# Patient Record
Sex: MALE | Race: WHITE | NOT HISPANIC OR LATINO
[De-identification: names, ages, dates, MRNs, and addresses within clinical notes are randomized per-mention and may not be internally consistent; named-entity substitution may affect disease eponyms.]

---

## 2017-09-07 ENCOUNTER — APPOINTMENT (OUTPATIENT)
Dept: ORTHOPEDIC SURGERY | Facility: CLINIC | Age: 67
End: 2017-09-07

## 2018-03-14 ENCOUNTER — APPOINTMENT (OUTPATIENT)
Dept: NEUROLOGY | Facility: CLINIC | Age: 68
End: 2018-03-14
Payer: MEDICARE

## 2018-03-14 VITALS
WEIGHT: 248 LBS | SYSTOLIC BLOOD PRESSURE: 146 MMHG | BODY MASS INDEX: 34.72 KG/M2 | HEIGHT: 71 IN | DIASTOLIC BLOOD PRESSURE: 96 MMHG

## 2018-03-14 DIAGNOSIS — F41.9 ANXIETY DISORDER, UNSPECIFIED: ICD-10-CM

## 2018-03-14 DIAGNOSIS — Z87.39 PERSONAL HISTORY OF OTHER DISEASES OF THE MUSCULOSKELETAL SYSTEM AND CONNECTIVE TISSUE: ICD-10-CM

## 2018-03-14 PROCEDURE — 99204 OFFICE O/P NEW MOD 45 MIN: CPT

## 2018-03-14 RX ORDER — BUSPIRONE HYDROCHLORIDE 30 MG/1
30 TABLET ORAL
Refills: 0 | Status: ACTIVE | COMMUNITY

## 2018-03-14 RX ORDER — ALPRAZOLAM 1 MG/1
1 TABLET ORAL
Refills: 0 | Status: ACTIVE | COMMUNITY

## 2019-11-13 ENCOUNTER — TRANSCRIPTION ENCOUNTER (OUTPATIENT)
Age: 69
End: 2019-11-13

## 2021-07-28 ENCOUNTER — LABORATORY RESULT (OUTPATIENT)
Age: 71
End: 2021-07-28

## 2021-07-28 ENCOUNTER — APPOINTMENT (OUTPATIENT)
Dept: NEUROLOGY | Facility: CLINIC | Age: 71
End: 2021-07-28
Payer: MEDICARE

## 2021-07-28 VITALS
HEIGHT: 73 IN | DIASTOLIC BLOOD PRESSURE: 70 MMHG | WEIGHT: 230 LBS | SYSTOLIC BLOOD PRESSURE: 130 MMHG | TEMPERATURE: 97.2 F | BODY MASS INDEX: 30.48 KG/M2

## 2021-07-28 DIAGNOSIS — Z83.3 FAMILY HISTORY OF DIABETES MELLITUS: ICD-10-CM

## 2021-07-28 DIAGNOSIS — G31.84 MILD COGNITIVE IMPAIRMENT, SO STATED: ICD-10-CM

## 2021-07-28 DIAGNOSIS — Z87.898 PERSONAL HISTORY OF OTHER SPECIFIED CONDITIONS: ICD-10-CM

## 2021-07-28 DIAGNOSIS — Z86.59 PERSONAL HISTORY OF OTHER MENTAL AND BEHAVIORAL DISORDERS: ICD-10-CM

## 2021-07-28 DIAGNOSIS — R25.1 TREMOR, UNSPECIFIED: ICD-10-CM

## 2021-07-28 PROCEDURE — 99072 ADDL SUPL MATRL&STAF TM PHE: CPT

## 2021-07-28 PROCEDURE — 99204 OFFICE O/P NEW MOD 45 MIN: CPT

## 2021-07-28 RX ORDER — DAPAGLIFLOZIN 10 MG/1
10 TABLET, FILM COATED ORAL
Refills: 0 | Status: ACTIVE | COMMUNITY

## 2021-07-28 RX ORDER — METHYLPHENIDATE HYDROCHLORIDE 5 MG/1
TABLET ORAL
Refills: 0 | Status: COMPLETED | COMMUNITY
End: 2021-07-28

## 2021-07-28 RX ORDER — FLUOXETINE HYDROCHLORIDE 20 MG/1
20 CAPSULE ORAL
Refills: 0 | Status: ACTIVE | COMMUNITY

## 2021-07-28 RX ORDER — TAMSULOSIN HYDROCHLORIDE 0.4 MG/1
0.4 CAPSULE ORAL
Refills: 0 | Status: ACTIVE | COMMUNITY

## 2021-07-28 RX ORDER — TOLTERODINE TARTRATE 4 MG/1
4 CAPSULE, EXTENDED RELEASE ORAL
Refills: 0 | Status: ACTIVE | COMMUNITY

## 2021-07-28 RX ORDER — SITAGLIPTIN 100 MG/1
TABLET, FILM COATED ORAL
Refills: 0 | Status: ACTIVE | COMMUNITY

## 2021-07-28 RX ORDER — LANSOPRAZOLE 30 MG/1
30 CAPSULE, DELAYED RELEASE ORAL
Refills: 0 | Status: ACTIVE | COMMUNITY

## 2021-07-28 RX ORDER — MULTIVITAMIN
TABLET ORAL
Refills: 0 | Status: COMPLETED | COMMUNITY
End: 2021-07-28

## 2021-07-29 LAB
FOLATE SERPL-MCNC: >20 NG/ML
T3RU NFR SERPL: 0.9 TBI
T4 SERPL-MCNC: 5.4 UG/DL
TSH SERPL-ACNC: 1.7 UIU/ML
VIT B12 SERPL-MCNC: 775 PG/ML

## 2021-07-29 NOTE — CONSULT LETTER
[Dear  ___] : Dear  [unfilled], [Courtesy Letter:] : I had the pleasure of seeing your patient, [unfilled], in my office today. [Please see my note below.] : Please see my note below. [Consult Closing:] : Thank you very much for allowing me to participate in the care of this patient.  If you have any questions, please do not hesitate to contact me. [Sincerely,] : Sincerely, [FreeTextEntry3] : Tab Veronica MD.

## 2021-07-29 NOTE — PHYSICAL EXAM
[General Appearance - Alert] : alert [General Appearance - In No Acute Distress] : in no acute distress [Oriented To Time, Place, And Person] : oriented to person, place, and time [Memory Remote] : remote memory was not impaired [Total Score ___ / 30] : the patient achieved a score of [unfilled] /30 [Date / Time ___ / 5] : date / time [unfilled] / 5 [Place ___ / 5] : place [unfilled] / 5 [Registration ___ / 3] : registration [unfilled] / 3 [Serial Sevens ___/5] : serial sevens [unfilled] / 5 [Naming 2 Objects ___ / 2] : naming two objects [unfilled] / 2 [Repeating a Sentence ___ / 1] : repeating a sentence [unfilled] / 1 [Writing a Sentence ___ / 1] : write sentence [unfilled] / 1 [3-stage Verbal Command ___ / 3] : three-stage verbal command [unfilled] / 3 [Written Command ___ / 1] : written command [unfilled] / 1 [Copy a Design ___ / 1] : copy a design [unfilled] / 1 [Recall ___ / 3] : recall [unfilled] / 3 [Cranial Nerves Optic (II)] : visual acuity intact bilaterally,  visual fields full to confrontation, pupils equal round and reactive to light [Cranial Nerves Oculomotor (III)] : extraocular motion intact [Cranial Nerves Trigeminal (V)] : facial sensation intact symmetrically [Cranial Nerves Facial (VII)] : face symmetrical [Cranial Nerves Vestibulocochlear (VIII)] : hearing was intact bilaterally [Cranial Nerves Glossopharyngeal (IX)] : tongue and palate midline [Cranial Nerves Accessory (XI - Cranial And Spinal)] : head turning and shoulder shrug symmetric [Cranial Nerves Hypoglossal (XII)] : there was no tongue deviation with protrusion [Motor Tone] : muscle tone was normal in all four extremities [Motor Strength] : muscle strength was normal in all four extremities [Sensation Tactile Decrease] : light touch was intact [Sensation Pain / Temperature Decrease] : pain and temperature was intact [Sensation Vibration Decrease] : vibration was intact [Abnormal Walk] : normal gait [Tremor] : a tremor present [2+] : Ankle jerk left 2+ [Optic Disc Abnormality] : the optic disc were normal in size and color [FreeTextEntry1] : Affect is somewhat blunted. [Dysarthria] : no dysarthria [Aphasia] : no dysphasia/aphasia [Romberg's Sign] : Romberg's sign was negtive [Coordination - Dysmetria Impaired Finger-to-Nose Bilateral] : not present [Plantar Reflex Right Only] : normal on the right [Plantar Reflex Left Only] : normal on the left

## 2021-07-29 NOTE — HISTORY OF PRESENT ILLNESS
[FreeTextEntry1] : I saw this patient in the office today.\par \par He is a 71-year-old man with a long history of tremor. He has not seemed to progress since 2018 when he was evaluated previously.\par He has no features of Parkinson's disease at present.\par \par He has a history of heavy alcohol abuse in the past although has stopped drinking completely .\par He now reports worsening anxiety and depression.\par \par He now has some memory difficulty.

## 2021-07-29 NOTE — DATA REVIEWED
[de-identified] : MRI of the brain and internal auditory canals was performed on 6/13/19 at [facility].\par The study was unremarkable.\par There were some scattered periventricular white matter hyperintensities consistent with chronic ischemic change.\par

## 2021-07-29 NOTE — ASSESSMENT
[FreeTextEntry1] : This is a 71-year-old man with a long history of tremor. He has not seemed to progress since 2018 when he was evaluated previously.\par He has no features of Parkinson's disease at present.\par \par He has a history of heavy alcohol abuse in the past although has stopped drinking completely .\par He now reports worsening anxiety and depression.\par \par He now has some memory difficulty.\par My feeling is that this is multifactorial with components from chronic alcohol abuse and depression.\par Imaging has been negative in the past.\par I would like to repeat a brain MRI.\par I will also obtain an EEG.\par I will also obtain blood tests for B12 and thyroid function.\par \par I will see him back after the above workup.\par \par I have also suggested he follow with a psychiatrist.

## 2021-07-31 LAB — METHYLMALONATE SERPL-SCNC: 201 NMOL/L

## 2021-08-06 ENCOUNTER — APPOINTMENT (OUTPATIENT)
Dept: NEUROLOGY | Facility: CLINIC | Age: 71
End: 2021-08-06
Payer: MEDICARE

## 2021-08-06 PROCEDURE — 95819 EEG AWAKE AND ASLEEP: CPT

## 2021-08-06 PROCEDURE — 93040 RHYTHM ECG WITH REPORT: CPT

## 2021-08-11 ENCOUNTER — NON-APPOINTMENT (OUTPATIENT)
Age: 71
End: 2021-08-11

## 2021-08-12 ENCOUNTER — NON-APPOINTMENT (OUTPATIENT)
Age: 71
End: 2021-08-12

## 2021-09-02 ENCOUNTER — APPOINTMENT (OUTPATIENT)
Dept: NEUROLOGY | Facility: CLINIC | Age: 71
End: 2021-09-02

## 2021-12-06 ENCOUNTER — APPOINTMENT (OUTPATIENT)
Dept: NEUROLOGY | Facility: CLINIC | Age: 71
End: 2021-12-06

## 2022-08-08 ENCOUNTER — EMERGENCY (EMERGENCY)
Facility: HOSPITAL | Age: 72
LOS: 1 days | Discharge: HOME | End: 2022-08-08
Attending: EMERGENCY MEDICINE
Payer: MEDICARE

## 2022-08-08 VITALS
DIASTOLIC BLOOD PRESSURE: 69 MMHG | OXYGEN SATURATION: 98 % | WEIGHT: 210.1 LBS | TEMPERATURE: 98 F | HEART RATE: 102 BPM | SYSTOLIC BLOOD PRESSURE: 111 MMHG | RESPIRATION RATE: 20 BRPM

## 2022-08-08 DIAGNOSIS — F33.2 MAJOR DEPRESSIVE DISORDER, RECURRENT SEVERE WITHOUT PSYCHOTIC FEATURES: ICD-10-CM

## 2022-08-08 LAB
ALBUMIN SERPL ELPH-MCNC: 3.8 G/DL — SIGNIFICANT CHANGE UP (ref 3.3–5.2)
ALP SERPL-CCNC: 67 U/L — SIGNIFICANT CHANGE UP (ref 40–120)
ALT FLD-CCNC: 19 U/L — SIGNIFICANT CHANGE UP
AMPHET UR-MCNC: NEGATIVE — SIGNIFICANT CHANGE UP
ANION GAP SERPL CALC-SCNC: 11 MMOL/L — SIGNIFICANT CHANGE UP (ref 5–17)
APAP SERPL-MCNC: <3 UG/ML — LOW (ref 10–26)
APPEARANCE UR: CLEAR — SIGNIFICANT CHANGE UP
AST SERPL-CCNC: 16 U/L — SIGNIFICANT CHANGE UP
BARBITURATES UR SCN-MCNC: NEGATIVE — SIGNIFICANT CHANGE UP
BASOPHILS # BLD AUTO: 0.06 K/UL — SIGNIFICANT CHANGE UP (ref 0–0.2)
BASOPHILS NFR BLD AUTO: 0.6 % — SIGNIFICANT CHANGE UP (ref 0–2)
BENZODIAZ UR-MCNC: POSITIVE
BILIRUB SERPL-MCNC: 0.8 MG/DL — SIGNIFICANT CHANGE UP (ref 0.4–2)
BILIRUB UR-MCNC: NEGATIVE — SIGNIFICANT CHANGE UP
BUN SERPL-MCNC: 19 MG/DL — SIGNIFICANT CHANGE UP (ref 8–20)
CALCIUM SERPL-MCNC: 8.7 MG/DL — SIGNIFICANT CHANGE UP (ref 8.4–10.5)
CHLORIDE SERPL-SCNC: 98 MMOL/L — SIGNIFICANT CHANGE UP (ref 98–107)
CO2 SERPL-SCNC: 29 MMOL/L — SIGNIFICANT CHANGE UP (ref 22–29)
COCAINE METAB.OTHER UR-MCNC: NEGATIVE — SIGNIFICANT CHANGE UP
COLOR SPEC: YELLOW — SIGNIFICANT CHANGE UP
CREAT SERPL-MCNC: 0.91 MG/DL — SIGNIFICANT CHANGE UP (ref 0.5–1.3)
DIFF PNL FLD: NEGATIVE — SIGNIFICANT CHANGE UP
EGFR: 88 ML/MIN/1.73M2 — SIGNIFICANT CHANGE UP
EOSINOPHIL # BLD AUTO: 0.08 K/UL — SIGNIFICANT CHANGE UP (ref 0–0.5)
EOSINOPHIL NFR BLD AUTO: 0.8 % — SIGNIFICANT CHANGE UP (ref 0–6)
ETHANOL SERPL-MCNC: <10 MG/DL — SIGNIFICANT CHANGE UP (ref 0–9)
GLUCOSE SERPL-MCNC: 225 MG/DL — HIGH (ref 70–99)
GLUCOSE UR QL: 50 MG/DL
HCT VFR BLD CALC: 37.8 % — LOW (ref 39–50)
HGB BLD-MCNC: 13.9 G/DL — SIGNIFICANT CHANGE UP (ref 13–17)
HIV 1 & 2 AB SERPL IA.RAPID: SIGNIFICANT CHANGE UP
IMM GRANULOCYTES NFR BLD AUTO: 0.6 % — SIGNIFICANT CHANGE UP (ref 0–1.5)
KETONES UR-MCNC: NEGATIVE — SIGNIFICANT CHANGE UP
LEUKOCYTE ESTERASE UR-ACNC: NEGATIVE — SIGNIFICANT CHANGE UP
LYMPHOCYTES # BLD AUTO: 3.09 K/UL — SIGNIFICANT CHANGE UP (ref 1–3.3)
LYMPHOCYTES # BLD AUTO: 32 % — SIGNIFICANT CHANGE UP (ref 13–44)
MCHC RBC-ENTMCNC: 32.1 PG — SIGNIFICANT CHANGE UP (ref 27–34)
MCHC RBC-ENTMCNC: 36.8 GM/DL — HIGH (ref 32–36)
MCV RBC AUTO: 87.3 FL — SIGNIFICANT CHANGE UP (ref 80–100)
METHADONE UR-MCNC: NEGATIVE — SIGNIFICANT CHANGE UP
MONOCYTES # BLD AUTO: 0.7 K/UL — SIGNIFICANT CHANGE UP (ref 0–0.9)
MONOCYTES NFR BLD AUTO: 7.2 % — SIGNIFICANT CHANGE UP (ref 2–14)
NEUTROPHILS # BLD AUTO: 5.67 K/UL — SIGNIFICANT CHANGE UP (ref 1.8–7.4)
NEUTROPHILS NFR BLD AUTO: 58.8 % — SIGNIFICANT CHANGE UP (ref 43–77)
NITRITE UR-MCNC: NEGATIVE — SIGNIFICANT CHANGE UP
OPIATES UR-MCNC: NEGATIVE — SIGNIFICANT CHANGE UP
PCP SPEC-MCNC: SIGNIFICANT CHANGE UP
PCP UR-MCNC: NEGATIVE — SIGNIFICANT CHANGE UP
PH UR: 5 — SIGNIFICANT CHANGE UP (ref 5–8)
PLATELET # BLD AUTO: 158 K/UL — SIGNIFICANT CHANGE UP (ref 150–400)
POTASSIUM SERPL-MCNC: 4 MMOL/L — SIGNIFICANT CHANGE UP (ref 3.5–5.3)
POTASSIUM SERPL-SCNC: 4 MMOL/L — SIGNIFICANT CHANGE UP (ref 3.5–5.3)
PROT SERPL-MCNC: 6.2 G/DL — LOW (ref 6.6–8.7)
PROT UR-MCNC: NEGATIVE — SIGNIFICANT CHANGE UP
RBC # BLD: 4.33 M/UL — SIGNIFICANT CHANGE UP (ref 4.2–5.8)
RBC # FLD: 12.6 % — SIGNIFICANT CHANGE UP (ref 10.3–14.5)
SALICYLATES SERPL-MCNC: <0.6 MG/DL — LOW (ref 10–20)
SARS-COV-2 RNA SPEC QL NAA+PROBE: SIGNIFICANT CHANGE UP
SODIUM SERPL-SCNC: 138 MMOL/L — SIGNIFICANT CHANGE UP (ref 135–145)
SP GR SPEC: 1.01 — SIGNIFICANT CHANGE UP (ref 1.01–1.02)
THC UR QL: NEGATIVE — SIGNIFICANT CHANGE UP
UROBILINOGEN FLD QL: NEGATIVE MG/DL — SIGNIFICANT CHANGE UP
WBC # BLD: 9.66 K/UL — SIGNIFICANT CHANGE UP (ref 3.8–10.5)
WBC # FLD AUTO: 9.66 K/UL — SIGNIFICANT CHANGE UP (ref 3.8–10.5)

## 2022-08-08 PROCEDURE — 90792 PSYCH DIAG EVAL W/MED SRVCS: CPT

## 2022-08-08 PROCEDURE — 99285 EMERGENCY DEPT VISIT HI MDM: CPT

## 2022-08-08 PROCEDURE — 93010 ELECTROCARDIOGRAM REPORT: CPT

## 2022-08-08 RX ORDER — DEXTROSE 50 % IN WATER 50 %
15 SYRINGE (ML) INTRAVENOUS ONCE
Refills: 0 | Status: DISCONTINUED | OUTPATIENT
Start: 2022-08-08 | End: 2022-08-13

## 2022-08-08 RX ORDER — ALLOPURINOL 300 MG
300 TABLET ORAL DAILY
Refills: 0 | Status: DISCONTINUED | OUTPATIENT
Start: 2022-08-08 | End: 2022-08-13

## 2022-08-08 RX ORDER — TAMSULOSIN HYDROCHLORIDE 0.4 MG/1
0.4 CAPSULE ORAL AT BEDTIME
Refills: 0 | Status: DISCONTINUED | OUTPATIENT
Start: 2022-08-08 | End: 2022-08-13

## 2022-08-08 RX ORDER — INSULIN LISPRO 100/ML
VIAL (ML) SUBCUTANEOUS
Refills: 0 | Status: DISCONTINUED | OUTPATIENT
Start: 2022-08-08 | End: 2022-08-13

## 2022-08-08 RX ORDER — DEXTROSE 50 % IN WATER 50 %
25 SYRINGE (ML) INTRAVENOUS ONCE
Refills: 0 | Status: DISCONTINUED | OUTPATIENT
Start: 2022-08-08 | End: 2022-08-13

## 2022-08-08 RX ORDER — DEXTROSE 50 % IN WATER 50 %
12.5 SYRINGE (ML) INTRAVENOUS ONCE
Refills: 0 | Status: DISCONTINUED | OUTPATIENT
Start: 2022-08-08 | End: 2022-08-13

## 2022-08-08 RX ORDER — ATORVASTATIN CALCIUM 80 MG/1
10 TABLET, FILM COATED ORAL AT BEDTIME
Refills: 0 | Status: DISCONTINUED | OUTPATIENT
Start: 2022-08-08 | End: 2022-08-13

## 2022-08-08 RX ORDER — SODIUM CHLORIDE 9 MG/ML
1000 INJECTION, SOLUTION INTRAVENOUS
Refills: 0 | Status: DISCONTINUED | OUTPATIENT
Start: 2022-08-08 | End: 2022-08-13

## 2022-08-08 RX ORDER — GLUCAGON INJECTION, SOLUTION 0.5 MG/.1ML
1 INJECTION, SOLUTION SUBCUTANEOUS ONCE
Refills: 0 | Status: DISCONTINUED | OUTPATIENT
Start: 2022-08-08 | End: 2022-08-13

## 2022-08-08 RX ADMIN — TAMSULOSIN HYDROCHLORIDE 0.4 MILLIGRAM(S): 0.4 CAPSULE ORAL at 21:32

## 2022-08-08 RX ADMIN — Medication 300 MILLIGRAM(S): at 21:32

## 2022-08-08 RX ADMIN — ATORVASTATIN CALCIUM 10 MILLIGRAM(S): 80 TABLET, FILM COATED ORAL at 21:32

## 2022-08-08 NOTE — ED ADULT NURSE NOTE - OBJECTIVE STATEMENT
Assumed care of patient in [facility] 14. Pt a&ox4 rr even and unlabored with pmhx of depression presents to ed with suicidal attempt. Pt reports attempting to overdose on xanax "maybe 30 pills". Pt denies si/hi tactile/visual/auditory hallucinations at this time. Pt denies wanting to self harm at this time. Pt denies chest pain, sob, gu/gi symptoms. pt educated on plan of care, pt able to successfully teach back plan of care to RN, RN will continue to reeducate pt during hospital stay.

## 2022-08-08 NOTE — ED BEHAVIORAL HEALTH ASSESSMENT NOTE - RISK ASSESSMENT
High Acute Suicide Risk High: depressive sx's, presenting following suicide attempt, hopeless, recent gesture, loss of dog, argument with wife

## 2022-08-08 NOTE — ED PROVIDER NOTE - CLINICAL SUMMARY MEDICAL DECISION MAKING FREE TEXT BOX
Pt is now approximately 11 hours form ingestion time which is th elimination half life of xanax, with no sign of airway compromise. Pt medically cleared and pending psych eval.

## 2022-08-08 NOTE — ED BEHAVIORAL HEALTH ASSESSMENT NOTE - SUMMARY
Patient is a  72 year old, male; domiciled with wife of over 40 years and dog, has 2 adult children together and 2 others (from wife) that don't live in home, retired  (retired 8 years ago); with past psychiatric h/o of depression; ; no psychiatric  hospitalizations;  h/o prior suicidal statements, h/o gesture several weeks ago; ; no known history of violence or arrests; no active substance abuse or known history of complicated withdrawal; PMH of GERD, prior HTN, DM, gout; brought in by EMS; called by wife after patient unresponsive and incoherent in the setting of taking Xanax as possible suicide attempt.     Patient with MDD severe recurrent presenting following a suicide attempt with Xanax.  Patient found with empty bottle of Xanax at bedside that he filled on 7/31/22.  Patient severely depressed, hopeless, minimizing suicide attempts.  Admits to anxiety.  He has not been taking his Fluoxetine. Reports recent stressor is death of his dog several weeks ago and recent argument with wife. He admitted life is intolerable. Pt's wife is concerned about safety and feels this is suicide attempt.  Patient requires inpatient hospitalization for safety and stabilizatoin

## 2022-08-08 NOTE — ED ADULT TRIAGE NOTE - CHIEF COMPLAINT QUOTE
C/o suicidal attempt. Pt states, "I don't want to be here anymore and this world disgust me". Pt states he took about 40-50 1mg Xanax pills. Hx of HTN. Pt placed in yellow gown. Belongings secured.

## 2022-08-08 NOTE — ED BEHAVIORAL HEALTH ASSESSMENT NOTE - DESCRIPTION
Patient is dysphoric, psychomotor retarded, speaking slowly while laying in stretcher.  Patient with poor eye contact, admits to depression and passive suicidal ideation, hopelessness, constricted almost flat affect, passive suicidal ideation, denying active suicidal ideation at this time.    ICU Vital Signs Last 24 Hrs  T(C): 36.7 (08 Aug 2022 19:13), Max: 36.7 (08 Aug 2022 15:32)  T(F): 98 (08 Aug 2022 19:13), Max: 98.1 (08 Aug 2022 15:32)  HR: 102 (08 Aug 2022 15:32) (102 - 102)  BP: 103/65 (08 Aug 2022 19:13) (103/65 - 111/69)  BP(mean): --  ABP: --  ABP(mean): --  RR: 20 (08 Aug 2022 19:13) (20 - 20)  SpO2: 95% (08 Aug 2022 19:13) (95% - 98%)    O2 Parameters below as of 08 Aug 2022 15:32  Patient On (Oxygen Delivery Method): room air see HPI Lives with wife, retired  (8 years ago), has two children with wife,  over 40 years Patient is dysphoric, psychomotor retarded, speaking slowly while laying in stretcher.  Patient with poor eye contact, admits to depression and passive suicidal ideation, hopelessness, constricted almost flat affect, passive suicidal ideation, denying active suicidal ideation at this time. Confirmed through [facility] reference #[id number] that patient filled Alprazolam 1 mg 30 day supply (90 pills ) on 7/21/22.      ICU Vital Signs Last 24 Hrs  T(C): 36.7 (08 Aug 2022 19:13), Max: 36.7 (08 Aug 2022 15:32)  T(F): 98 (08 Aug 2022 19:13), Max: 98.1 (08 Aug 2022 15:32)  HR: 102 (08 Aug 2022 15:32) (102 - 102)  BP: 103/65 (08 Aug 2022 19:13) (103/65 - 111/69)  BP(mean): --  ABP: --  ABP(mean): --  RR: 20 (08 Aug 2022 19:13) (20 - 20)  SpO2: 95% (08 Aug 2022 19:13) (95% - 98%)    O2 Parameters below as of 08 Aug 2022 15:32  Patient On (Oxygen Delivery Method): room air

## 2022-08-08 NOTE — ED BEHAVIORAL HEALTH ASSESSMENT NOTE - HPI (INCLUDE ILLNESS QUALITY, SEVERITY, DURATION, TIMING, CONTEXT, MODIFYING FACTORS, ASSOCIATED SIGNS AND SYMPTOMS)
Patient is a  72 year old, male; domiciled with wife of over 40 years and dog, has 2 adult children together and 2 others (from wife) that don't live in home, retired  (retired 8 years ago); with past psychiatric h/o of depression; ; no psychiatric  hospitalizations;  h/o prior suicidal statements, h/o gesture several weeks ago; ; no known history of violence or arrests; no active substance abuse or known history of complicated withdrawal; PMH of GERD, prior HTN, DM, gout; brought in by EMS; called by wife after patient unresponsive and incoherent in the setting of taking Xanax as possible suicide attempt.             Patient presented after apparent suicide attempt. When his wife came back from work at 2:30 she found him unconscious in bed.  She reported that she thought he was dead. She tried to wake him up and he babbled incoherently and she called 911.  She reported that she found his bottle of Xanax empty by his bedside.  Patient had filled a  bottle of Xanax 1 mg (90 pills) on 22 and it was completely empty.         On triage patient reported that he did not want to be in the world anymore and was disgusted by the world.     He disclosed taking  40-50 1mg Xanax pills. Hx of HTN. Pt placed in yellow gown. Belongings secured.  He admitted to ER attending that he took  "maybe 30" and said that he did not want to live anymore.        On interview, patient was laying in stretcher sleeping initially but was able to wake up and speak with writer.  He spoke slowly in a low voice, appeared lethargic/psychomotor retarded. He was able to answer questions but did not spontaneously elaborate. He reports that he has been dealing with depression for a long time but is worse since his dog  4-5 weeks ago.  He admits that he stopped taking his psychiatric medications because nothing works.  He was last prescribed Prozac by PMD . Patient reports that he has been fighting with his wife and that he can't take the bickering anymore.  He reported life has no purpose and meaning and he lost his one dog.  He also reported difficulty with his balancing and he fell last week.       He states that his depression goes up and down but has been worse over the last several weeks. He stated that he can't sleep but then reported that he sleeps 10 hours but continues to feel tired and has been taking additional Xanax to sleep in the morning for a few hours.  He stated "I had a good life" and that he used to enjoy his life as an .  He admits to anhedonia, low energy, poor appetite, "no ambition", feels that everything is pointless.  He appeared to be minimizing ingestion.  He reported that he takes a couple to go to sleep. He admitted that there was "no more than 1/2 the bottle" that he took of Xanax.  He denied suicidal intent when he took the pills to writer but could not explain why he took so many. He reported that nothing works.   He reports long struggle with anxiety. He stated Xanax does not work anymore.     Concerning other psychiatric symptoms, pt denies d any aggressive or violent behavior towards others. Pt denies any episodes of bizarre happiness, unusual energy, unusual nightime excitation or other common symptoms of josi. Pt denies hearing voices or seeing things.  No delusions were elicited.      Spoke with wife, who reported that patient sank into a deep depression after retiring from his job 8 years ago. She reported multiple medication trials and trial of TMS which helped him for a while.  His anxiety was improved.  She did report that he been depressed and doing "nothing for months" and has been more depressed over the last several week.  She does feel that death of dog may be an additional stressor.  He has been deconditioned since he sits in a chair and "is a vegetable".  She states that he watches TV and does not remember what he sees.   She states that 6 or 7 weeks ago, after they had an argument he threatened to take pills and took 7-8 pills, but not more.  He has been making statements that he does not want to be here.   She states that he had a physical therapy appointment recently to start PT but fell last week off the porch. He has been walking but with poor balance.  He does not want to see his doctors but has been following up with GP.  He has had a recent colonoscopy.  Wife believes that today was a suicide attempts.   She admits that they got into a recent argument and she has not spoken to him for two days.  She is concerned about his safety, and feels that he is deeply depressed.  She verified his medications.

## 2022-08-08 NOTE — ED BEHAVIORAL HEALTH ASSESSMENT NOTE - CURRENT MEDICATION
Alprazolam 1 mg Take up to 3 daily PRN anxiety, Tamsulosin .4 mg daily, Allopurinol 300 mg daily, Fluoxetine 40 mg daily (but has not been taking-unclear for how long), Atorvastatin 10 mg daily, Omeprazole 40 mg daily , Buspar 10 mg twice daily, Vitamin D3, Zinc, Super Complex B

## 2022-08-08 NOTE — CHART NOTE - NSCHARTNOTEFT_GEN_A_CORE
SWNote: pt seen by behavioral MD(Jenniffer) pt needs transfer 9.37 status . No beds available at this moment [facility]) ,[facility] ([facility] and [facility] ([facility]) . SW to follow in the am . SWNote: pt seen by behavioral MD(Jenniffer) pt needs transfer 9.37 status . No beds available at [facility] ([facility]) this evening . SW to follow in the am .

## 2022-08-08 NOTE — ED PROVIDER NOTE - OBJECTIVE STATEMENT
72 year old male with PMH depression presents with suicide attempt. Pt states that he has a long standing hx of depression and then today in a suicide attempt tried to overdose on xanax. The pt states that he took the pills at approximately 630 am and states he took a "handful". When asked to estimate and quantity he said "maybe 30". He denies any physical complaints at this time including no chest pain, SOB, abd pain, nausea, vomiting.

## 2022-08-08 NOTE — ED BEHAVIORAL HEALTH ASSESSMENT NOTE - DETAILS
Patient denies active suicidal ideation, appears to be minimizing,  but does not explain why he took 30-40 Xanax, markedly hopeless, depressed. Appears to be minimizing.   He implied earlier that it was a suicide attempts, and wife believes that to be the case. spoke with wife NA as above

## 2022-08-08 NOTE — ED ADULT NURSE NOTE - NSIMPLEMENTINTERV_GEN_ALL_ED
Implemented All Fall Risk Interventions:  Manor to call system. Call bell, personal items and telephone within reach. Instruct patient to call for assistance. Room bathroom lighting operational. Non-slip footwear when patient is off stretcher. Physically safe environment: no spills, clutter or unnecessary equipment. Stretcher in lowest position, wheels locked, appropriate side rails in place. Provide visual cue, wrist band, yellow gown, etc. Monitor gait and stability. Monitor for mental status changes and reorient to person, place, and time. Review medications for side effects contributing to fall risk. Reinforce activity limits and safety measures with patient and family.

## 2022-08-08 NOTE — ED ADULT NURSE NOTE - HPI (INCLUDE ILLNESS QUALITY, SEVERITY, DURATION, TIMING, CONTEXT, MODIFYING FACTORS, ASSOCIATED SIGNS AND SYMPTOMS)
Pt rec'd to  in yellow gown with slow and steady gait.  Pt walk  pass metal detector Pt states that his dog brought him here. .Pt states that the dog was or need a procedure and he was going to stay in bed with him. Pt denies any si/hi/ah/vh.  Pt states that he lives with his wife and that she takes care of everything.  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  Pt denies any allergies unable to give list of his medications.  Pt states that he is a diabetic and takes medication but does not know names  Pt states having prior history of anxiety and that he takes Xanax 1 mg at night for sleep, but will take additional 1 mg if he tosses and turns.  Pt denies any out patient or in patient services. and states that he does not have access to FA.  Pt orientated to unit and room.  Offered food and po fluid which patient declined and states that he just want to sleep.

## 2022-08-08 NOTE — ED BEHAVIORAL HEALTH ASSESSMENT NOTE - OTHER PAST PSYCHIATRIC HISTORY (INCLUDE DETAILS REGARDING ONSET, COURSE OF ILLNESS, INPATIENT/OUTPATIENT TREATMENT)
H/o depressive sx's and anxiety.  Depression worsened after senior care, h/o multiple trials of psychotropics, prior TMS

## 2022-08-09 LAB
A1C WITH ESTIMATED AVERAGE GLUCOSE RESULT: 10.1 % — HIGH (ref 4–5.6)
ESTIMATED AVERAGE GLUCOSE: 243 MG/DL — HIGH (ref 68–114)
GLUCOSE BLDC GLUCOMTR-MCNC: 130 MG/DL — HIGH (ref 70–99)
GLUCOSE BLDC GLUCOMTR-MCNC: 217 MG/DL — HIGH (ref 70–99)
GLUCOSE BLDC GLUCOMTR-MCNC: 253 MG/DL — HIGH (ref 70–99)
GLUCOSE BLDC GLUCOMTR-MCNC: 271 MG/DL — HIGH (ref 70–99)

## 2022-08-09 PROCEDURE — 99218: CPT

## 2022-08-09 RX ADMIN — Medication 300 MILLIGRAM(S): at 15:33

## 2022-08-09 RX ADMIN — TAMSULOSIN HYDROCHLORIDE 0.4 MILLIGRAM(S): 0.4 CAPSULE ORAL at 22:29

## 2022-08-09 RX ADMIN — ATORVASTATIN CALCIUM 10 MILLIGRAM(S): 80 TABLET, FILM COATED ORAL at 22:29

## 2022-08-09 RX ADMIN — Medication 6: at 17:53

## 2022-08-09 RX ADMIN — Medication 4: at 08:20

## 2022-08-09 RX ADMIN — Medication 6: at 12:05

## 2022-08-09 NOTE — CHART NOTE - NSCHARTNOTEFT_GEN_A_CORE
SW Note: notified by  provider that the plan is to transfer pt for inpt psychiatric care. Labs completed, covid neg 8/8/22. PT eval completed. Pt mod assist with no device. No beds available at [facility] ( [facility] unit) and [facility] ( [facility] unit). No beds available to discuss a possible transfer to  and [place]. SW will continue to follow

## 2022-08-09 NOTE — PHYSICAL THERAPY INITIAL EVALUATION ADULT - PERTINENT HX OF CURRENT PROBLEM, REHAB EVAL
· HPI Objective Statement: 72 year old male with PMH depression presents with suicide attempt. Pt states that he has a long standing hx of depression and then today in a suicide attempt tried to overdose on xanax. The pt states that he took the pills at approximately 630 am and states he took a "handful". When asked to estimate and quantity he said "maybe 30". He denies any physical complaints at this time including no chest pain, SOB, abd pain, nausea, vomiting.

## 2022-08-09 NOTE — ED CDU PROVIDER INITIAL DAY NOTE - MEDICAL DECISION MAKING DETAILS
Requires involuntary psychiatric admission for suicidal gesture. Being held for bed placement. Wife had reported some falls at home, will get PT eval in AM to assess fall risk.

## 2022-08-09 NOTE — PROVIDER CONTACT NOTE (OTHER) - BACKGROUND
72 year old male with PMH depression presents with suicide attempt. Pt states that he has a long standing hx of depression and then today in a suicide attempt tried to overdose on xanax.

## 2022-08-09 NOTE — PROVIDER CONTACT NOTE (OTHER) - ACTION/TREATMENT ORDERED:
goals(2-3 days): 1. independent bed mobility, 2. Modified Independent amb 100' with RW, 3. Modified Independent sit to/from stand and stand pivot with RW, 4. assess stairs. plan: balance and mobility.

## 2022-08-09 NOTE — PHYSICAL THERAPY INITIAL EVALUATION ADULT - ADDITIONAL COMMENTS
pt states he lives with his wife in a 2-story house with 3 steps to enter (no rail) then 9 to second floor bedroom (+rail). independent prior to admit. no DME.

## 2022-08-10 LAB
GLUCOSE BLDC GLUCOMTR-MCNC: 224 MG/DL — HIGH (ref 70–99)
GLUCOSE BLDC GLUCOMTR-MCNC: 229 MG/DL — HIGH (ref 70–99)
GLUCOSE BLDC GLUCOMTR-MCNC: 275 MG/DL — HIGH (ref 70–99)
SARS-COV-2 RNA SPEC QL NAA+PROBE: SIGNIFICANT CHANGE UP

## 2022-08-10 PROCEDURE — 99226: CPT

## 2022-08-10 PROCEDURE — 99213 OFFICE O/P EST LOW 20 MIN: CPT

## 2022-08-10 RX ORDER — HALOPERIDOL DECANOATE 100 MG/ML
5 INJECTION INTRAMUSCULAR ONCE
Refills: 0 | Status: COMPLETED | OUTPATIENT
Start: 2022-08-10 | End: 2022-08-10

## 2022-08-10 RX ADMIN — Medication 2 MILLIGRAM(S): at 04:53

## 2022-08-10 RX ADMIN — HALOPERIDOL DECANOATE 5 MILLIGRAM(S): 100 INJECTION INTRAMUSCULAR at 04:52

## 2022-08-10 RX ADMIN — ATORVASTATIN CALCIUM 10 MILLIGRAM(S): 80 TABLET, FILM COATED ORAL at 22:23

## 2022-08-10 RX ADMIN — Medication 4: at 12:41

## 2022-08-10 RX ADMIN — Medication 4: at 16:54

## 2022-08-10 RX ADMIN — Medication 300 MILLIGRAM(S): at 12:48

## 2022-08-10 RX ADMIN — TAMSULOSIN HYDROCHLORIDE 0.4 MILLIGRAM(S): 0.4 CAPSULE ORAL at 22:22

## 2022-08-10 NOTE — ED BEHAVIORAL HEALTH NOTE - BEHAVIORAL HEALTH NOTE
PROGRESS NOTE: 08-10-22 @ 08:59  	  • Reason for Ongoing Consultation: 	    ID: [id number] Male with HEALTH ISSUES - PROBLEM Dx:  Major depressive disorder, recurrent, severe w/o psychotic behavior            INTERVAL DATA:   • Interval Chief Complaint: pt s/p overdose and aggressive behavior, unwilling to communicate this morning  • Interval History:  Pt seen at bedside today laying down with lights off. Pt was reluctant to communicate with team and did not open his eyes.  Pt attempted to leave unit this morning around 4:30am and became aggressive. He was given 5 mg Haldol IM and 2 mg Ativan at 4:52am. Pt denies any thoughts to harm himself or others today and denies visual/auditory hallucinations.    REVIEW OF SYSTEMS:   • Constitutional Symptoms	No complaints  • Eyes	No complaints  • Ears / Nose / Throat / Mouth	No complaints  • Cardiovascular	No complaints  • Respiratory	No complaints  • Gastrointestinal	No complaints  • Genitourinary	No complaints  • Musculoskeletal	No complaints  • Skin	No complaints  • Neurological	No complaints  • Psychiatric (see HPI)	See HPI  • Endocrine	No complaints  • Hematologic / Lymphatic	No complaints  • Allergic / Immunologic	No complaints    REVIEW OF VITALS/LABS/IMAGING/INVESTIGATIONS:   • Vital signs reviewed: Yes  • Vital Signs:	    T(C): 36.4 (08-10-22 @ 02:17), Max: 36.7 (22 @ 19:25)  HR: 95 (08-10-22 @ 02:17) (76 - 95)  BP: 166/84 (08-10-22 @ 02:17) (132/78 - 166/84)  RR: 19 (08-10-22 @ 02:17) (18 - 20)  SpO2: 98% (08-10-22 @ 02:17) (93% - 98%)    • Available labs reviewed: Yes  • Available Lab Results:                           13.9   9.66  )-----------( 158      ( 08 Aug 2022 16:08 )             37.8         138  |  98  |  19.0  ----------------------------<  225<H>  4.0   |  29.0  |  0.91    Ca    8.7      08 Aug 2022 16:08    TPro  6.2<L>  /  Alb  3.8  /  TBili  0.8  /  DBili  x   /  AST  16  /  ALT  19  /  AlkPhos  67  08-08    LIVER FUNCTIONS - ( 08 Aug 2022 16:08 )  Alb: 3.8 g/dL / Pro: 6.2 g/dL / ALK PHOS: 67 U/L / ALT: 19 U/L / AST: 16 U/L / GGT: x             Urinalysis Basic - ( 08 Aug 2022 18:54 )    Color: Yellow / Appearance: Clear / S.015 / pH: x  Gluc: x / Ketone: Negative  / Bili: Negative / Urobili: Negative mg/dL   Blood: x / Protein: Negative / Nitrite: Negative   Leuk Esterase: Negative / RBC: x / WBC x   Sq Epi: x / Non Sq Epi: x / Bacteria: x          MEDICATIONS:      PRN Medications:  • PRN Medications since last evaluation	  • PRN Details  5 mg Haldol IM and 2 mg Ativan at 4:52am 8/10/22 for aggressive behavior and attempts to leave unit.    Current Medications:   allopurinol 300 milliGRAM(s) Oral daily  atorvastatin 10 milliGRAM(s) Oral at bedtime  dextrose 5%. 1000 milliLiter(s) IV Continuous <Continuous>  dextrose 5%. 1000 milliLiter(s) IV Continuous <Continuous>  dextrose 50% Injectable 25 Gram(s) IV Push once  dextrose 50% Injectable 12.5 Gram(s) IV Push once  dextrose 50% Injectable 25 Gram(s) IV Push once  dextrose Oral Gel 15 Gram(s) Oral once PRN  glucagon  Injectable 1 milliGRAM(s) IntraMuscular once  insulin lispro (ADMELOG) corrective regimen sliding scale   SubCutaneous three times a day before meals  tamsulosin 0.4 milliGRAM(s) Oral at bedtime     Medication Side Effects:  • Medication Side Effects or Adverse Reactions (new or ongoing)	None known    MENTAL STATUS EXAM:   • Level of Consciousness	Alert  • General Appearance	Well developed, laying in bed on side with eyes closed  • Body Habitus	Well nourished  • Hygiene	Good  • Grooming	Good  • Behavior	Cooperative  • Eye Contact	poor, patient reluctant to open eyes  • Relatedness	Good  • Impulse Control	Normal  • Muscle Tone / Strength	Normal muscle tone/strength  • Abnormal Movements	No abnormal movements  • Gait / Station	Normal gait / station  • Speech Volume	Low  • Speech Rate	Normal  • Speech Spontaneity	Pt reluctant to speak using only "yes or no"  • Speech Articulation	Normal  • Mood	Depressed  • Affect Quality	Sad  • Affect Range	Constricted  • Affect Congruence	Congruent  • Thought Process	Linear  • Thought Associations	Normal  • Thought Content	Unremarkable  • Perceptions	No abnormalities  • Oriented to Time	Yes  • Oriented to Place	Yes  • Oriented to Situation	Yes  • Oriented to Person	Yes  • Attention / Concentration	Normal  • Estimated Intelligence	Average  • Recent Memory	Normal  • Remote Memory	Normal  • Fund of Knowledge	Normal  • Language	No abnormalities noted  • Judgment (regarding everyday events)	Fair  • Insight (regarding psychiatric illness)	Fair    SUICIDALITY:   • Suicidality (Interval) Patient attempted to overdose on Xanax 2 days ago, has history of MDD and anxiety. No prior suicide attempts.     HOMICIDALITY/AGGRESSION:   • Homicidality/Aggression	none known    DIAGNOSIS DSM-V:    Psychiatric Diagnosis (Corresponds to DSM-IV Axis I, II):   HEALTH ISSUES - PROBLEM Dx:  Major depressive disorder, recurrent, severe w/o psychotic behavior             Medical Diagnosis (Corresponds to DSM-IV Axis III):  • Axis III	NA      ASSESSMENT OF CURRENT CONDITION:   Summary (include case differential, formulation and patient response to therapy): 72 year old male PPH of MDD s/p attempted overdose with Xanax 2 days ago. Pt seen at bedside today, laying in bed, reluctant to speak to team using only yes or no answers and not opening eyes. Pt denying any suicidal or homicidal ideations. Pt made aware of plan for transfer to in patient psych facility.     Risk Assessment (consider static vs modifiable risk factors and protective factors; comment on level of risk for dangerous behavior):  RF: recent suicide attempt, history of MDD, current stressors in life, aggressive behavior in unit, PF: good home support by wife     PLAN Explore transfer options. Continue psychotropic medications. Continue to assess for aggressive behavior and safety. PROGRESS NOTE: 08-10-22 @ 08:59  	  • Reason for Ongoing Consultation: 	awaiting transfer    ID: [id number] Male with HEALTH ISSUES - PROBLEM Dx:  Major depressive disorder, recurrent, severe w/o psychotic behavior            INTERVAL DATA:   • Interval Chief Complaint: pt s/p overdose and aggressive behavior, unwilling to communicate this morning  • Interval History:  Pt seen at bedside today laying down with lights off. Pt was reluctant to communicate with team and did not open his eyes.  Pt attempted to leave unit this morning around 4:30am and became aggressive. He was given 5 mg Haldol IM and 2 mg Ativan at 4:52am. Pt denies any thoughts to harm himself or others today and denies visual/auditory hallucinations.    REVIEW OF SYSTEMS:   • Constitutional Symptoms	No complaints  • Eyes	No complaints  • Ears / Nose / Throat / Mouth	No complaints  • Cardiovascular	No complaints  • Respiratory	No complaints  • Gastrointestinal	No complaints  • Genitourinary	No complaints  • Musculoskeletal	No complaints  • Skin	No complaints  • Neurological	No complaints  • Psychiatric (see HPI)	See HPI  • Endocrine	No complaints  • Hematologic / Lymphatic	No complaints  • Allergic / Immunologic	No complaints    REVIEW OF VITALS/LABS/IMAGING/INVESTIGATIONS:   • Vital signs reviewed: Yes  • Vital Signs:	    T(C): 36.4 (08-10-22 @ 02:17), Max: 36.7 (22 @ 19:25)  HR: 95 (08-10-22 @ 02:17) (76 - 95)  BP: 166/84 (08-10-22 @ 02:17) (132/78 - 166/84)  RR: 19 (08-10-22 @ 02:17) (18 - 20)  SpO2: 98% (08-10-22 @ 02:17) (93% - 98%)    • Available labs reviewed: Yes  • Available Lab Results:                           13.9   9.66  )-----------( 158      ( 08 Aug 2022 16:08 )             37.8         138  |  98  |  19.0  ----------------------------<  225<H>  4.0   |  29.0  |  0.91    Ca    8.7      08 Aug 2022 16:08    TPro  6.2<L>  /  Alb  3.8  /  TBili  0.8  /  DBili  x   /  AST  16  /  ALT  19  /  AlkPhos  67  08-08    LIVER FUNCTIONS - ( 08 Aug 2022 16:08 )  Alb: 3.8 g/dL / Pro: 6.2 g/dL / ALK PHOS: 67 U/L / ALT: 19 U/L / AST: 16 U/L / GGT: x             Urinalysis Basic - ( 08 Aug 2022 18:54 )    Color: Yellow / Appearance: Clear / S.015 / pH: x  Gluc: x / Ketone: Negative  / Bili: Negative / Urobili: Negative mg/dL   Blood: x / Protein: Negative / Nitrite: Negative   Leuk Esterase: Negative / RBC: x / WBC x   Sq Epi: x / Non Sq Epi: x / Bacteria: x          MEDICATIONS:      PRN Medications:  • PRN Medications since last evaluation	  • PRN Details  5 mg Haldol IM and 2 mg Ativan at 4:52am 8/10/22 for aggressive behavior and attempts to leave unit.    Current Medications:   allopurinol 300 milliGRAM(s) Oral daily  atorvastatin 10 milliGRAM(s) Oral at bedtime  dextrose 5%. 1000 milliLiter(s) IV Continuous <Continuous>  dextrose 5%. 1000 milliLiter(s) IV Continuous <Continuous>  dextrose 50% Injectable 25 Gram(s) IV Push once  dextrose 50% Injectable 12.5 Gram(s) IV Push once  dextrose 50% Injectable 25 Gram(s) IV Push once  dextrose Oral Gel 15 Gram(s) Oral once PRN  glucagon  Injectable 1 milliGRAM(s) IntraMuscular once  insulin lispro (ADMELOG) corrective regimen sliding scale   SubCutaneous three times a day before meals  tamsulosin 0.4 milliGRAM(s) Oral at bedtime     Medication Side Effects:  • Medication Side Effects or Adverse Reactions (new or ongoing)	None known    MENTAL STATUS EXAM:   • Level of Consciousness	Alert  • General Appearance	Well developed, laying in bed on side with eyes closed  • Body Habitus	Well nourished  • Hygiene	Good  • Grooming	Good  • Behavior	Cooperative  • Eye Contact	poor, patient reluctant to open eyes  • Relatedness	Good  • Impulse Control	Normal  • Muscle Tone / Strength	Normal muscle tone/strength  • Abnormal Movements	No abnormal movements  • Gait / Station	Normal gait / station  • Speech Volume	Low  • Speech Rate	Normal  • Speech Spontaneity	Pt reluctant to speak using only "yes or no"  • Speech Articulation	Normal  • Mood	Depressed  • Affect Quality	Sad  • Affect Range	Constricted  • Affect Congruence	Congruent  • Thought Process	Linear  • Thought Associations	Normal  • Thought Content	Unremarkable  • Perceptions	No abnormalities  • Oriented to Time	Yes  • Oriented to Place	Yes  • Oriented to Situation	Yes  • Oriented to Person	Yes  • Attention / Concentration	Normal  • Estimated Intelligence	Average  • Recent Memory	Normal  • Remote Memory	Normal  • Fund of Knowledge	Normal  • Language	No abnormalities noted  • Judgment (regarding everyday events)	Fair  • Insight (regarding psychiatric illness)	Fair    SUICIDALITY:   • Suicidality (Interval) Patient attempted to overdose on Xanax 2 days ago, has history of MDD and anxiety. No prior suicide attempts.     HOMICIDALITY/AGGRESSION:   • Homicidality/Aggression	none known    DIAGNOSIS DSM-V:    Psychiatric Diagnosis (Corresponds to DSM-IV Axis I, II):   HEALTH ISSUES - PROBLEM Dx:  Major depressive disorder, recurrent, severe w/o psychotic behavior             Medical Diagnosis (Corresponds to DSM-IV Axis III):  • Axis III	NA      ASSESSMENT OF CURRENT CONDITION:   Summary (include case differential, formulation and patient response to therapy): 72 year old male PPH of MDD s/p attempted overdose with Xanax 2 days ago. Pt seen at bedside today, laying in bed, reluctant to speak to team using only yes or no answers and not opening eyes. Pt denying any suicidal or homicidal ideations. Pt made aware of plan for transfer to in patient psych facility.     Risk Assessment (consider static vs modifiable risk factors and protective factors; comment on level of risk for dangerous behavior):  RF: recent suicide attempt, history of MDD, current stressors in life, aggressive behavior in unit, PF: good home support by wife     PLAN Explore transfer options. . Continue to assess for aggressive behavior and safety.

## 2022-08-10 NOTE — ED ADULT NURSE REASSESSMENT NOTE - DESCRIPTION
Pt rec'd to  went through metal detector Pt stats that he was brought here by his dog  for a procedure and that the dog was going to stay with him.  Pt disorganized appears depressed and poor historian.  Pt with steady slow gait. Pt orienataed to unit and  food and po fluids offered.  Will monitor and chart changes and maintain safe environment

## 2022-08-10 NOTE — CHART NOTE - NSCHARTNOTEFT_GEN_A_CORE
SW Note: Plan is to transfer pt for inpt psychiatric care. Spoke with [facility] [phone], no beds available, [facility] [phone] they are not able to accept transfers at this time due to bed availability and pts in the ED,  reports no beds. Contacted [facility] [phone], beds available. Chart faxed for MD review. Requested anew covid since their policy is a neg within 24 hrs of transfer.  provider and RN aware.

## 2022-08-11 VITALS
SYSTOLIC BLOOD PRESSURE: 145 MMHG | TEMPERATURE: 98 F | DIASTOLIC BLOOD PRESSURE: 90 MMHG | HEART RATE: 100 BPM | RESPIRATION RATE: 18 BRPM | OXYGEN SATURATION: 95 %

## 2022-08-11 LAB
GLUCOSE BLDC GLUCOMTR-MCNC: 247 MG/DL — HIGH (ref 70–99)
GLUCOSE BLDC GLUCOMTR-MCNC: 302 MG/DL — HIGH (ref 70–99)
GLUCOSE BLDC GLUCOMTR-MCNC: 361 MG/DL — HIGH (ref 70–99)

## 2022-08-11 PROCEDURE — 81003 URINALYSIS AUTO W/O SCOPE: CPT

## 2022-08-11 PROCEDURE — 85025 COMPLETE CBC W/AUTO DIFF WBC: CPT

## 2022-08-11 PROCEDURE — 93005 ELECTROCARDIOGRAM TRACING: CPT

## 2022-08-11 PROCEDURE — 99285 EMERGENCY DEPT VISIT HI MDM: CPT | Mod: 25

## 2022-08-11 PROCEDURE — 86703 HIV-1/HIV-2 1 RESULT ANTBDY: CPT

## 2022-08-11 PROCEDURE — G0378: CPT

## 2022-08-11 PROCEDURE — 82962 GLUCOSE BLOOD TEST: CPT

## 2022-08-11 PROCEDURE — 96374 THER/PROPH/DIAG INJ IV PUSH: CPT

## 2022-08-11 PROCEDURE — 80307 DRUG TEST PRSMV CHEM ANLYZR: CPT

## 2022-08-11 PROCEDURE — 83036 HEMOGLOBIN GLYCOSYLATED A1C: CPT

## 2022-08-11 PROCEDURE — U0005: CPT

## 2022-08-11 PROCEDURE — U0003: CPT

## 2022-08-11 PROCEDURE — 99217: CPT

## 2022-08-11 PROCEDURE — 80053 COMPREHEN METABOLIC PANEL: CPT

## 2022-08-11 PROCEDURE — 36415 COLL VENOUS BLD VENIPUNCTURE: CPT

## 2022-08-11 RX ADMIN — Medication 300 MILLIGRAM(S): at 12:54

## 2022-08-11 RX ADMIN — Medication 8: at 08:20

## 2022-08-11 RX ADMIN — Medication 4: at 12:53

## 2022-08-11 NOTE — ED CDU PROVIDER SUBSEQUENT DAY NOTE - SOCIAL CONCERNS
psych and social work involved/Complex psychosocial needs/coping issues
psych and social work involved/Complex psychosocial needs/coping issues

## 2022-08-11 NOTE — ED CDU PROVIDER SUBSEQUENT DAY NOTE - PHYSICAL EXAMINATION
Gen: Well appearing in NAD  Head: NC/AT  Neck: trachea midline  Resp:  No distress  Ext: no deformities  Neuro:  A&O appears non focal  Skin:  Warm and dry as visualized  Psych:  Normal affect
Gen: Well appearing in NAD  Head: NC/AT  Neck: trachea midline  Resp:  No distress  Ext: no deformities  Neuro:  A&O appears non focal  Skin:  Warm and dry as visualized  Psych:  Normal affect

## 2022-08-11 NOTE — PROVIDER CONTACT NOTE (OTHER) - ASSESSMENT
pt ok for PT per edith mendoza. pt asleep on stretcher on room air. aroused to verbal stim. AAOx2. pain 0/10 rest, 0/10 after PT. agreeable to eval. pt required assist for mobility due to decreased balance. pt returned to stretcher. left in nad w/all needs attended to by 1:1 supervision, dee. will follow. handoff to edith mendoza.
Pt is functionally independent and not in need of PT.  Will no longer continue to follow. Pt left on edge of bed in no apparent distress and call bell within reach, RN made aware.

## 2022-08-11 NOTE — ED CDU PROVIDER SUBSEQUENT DAY NOTE - NSICDXPASTMEDICALHX_GEN_ALL_CORE_FT
Right ear pain since yesterday. Dad denies fevers. Dad denies n/v/d/cough.
PAST MEDICAL HISTORY:  No pertinent past medical history
PAST MEDICAL HISTORY:  No pertinent past medical history

## 2022-08-11 NOTE — ED BEHAVIORAL HEALTH NOTE - BEHAVIORAL HEALTH NOTE
PROGRESS NOTE: 08-11-22 @ 08:28  	  • Reason for Ongoing Consultation: suicidal ideations and attempted overdose with Xanax    ID: [id number] Male with HEALTH ISSUES - PROBLEM Dx:  Major depressive disorder, recurrent, severe w/o psychotic behavior            INTERVAL DATA:   • Interval Chief Complaint: " I feel good today"  • Interval History: 72 year old male seen at bedside, sitting up eating breakfast and cooperative. PPH of MDD s/p attempted overdose with Xanax 4 days ago states he is feeling good today. Pt expressing guilt for attempting suicide and previous erratic behavior, says " I am sorry I even attempted anything I never want to go through this again".  Pt stating that he does not need help with current issues. He denies any suicidal/homicidal ideations today, attributes attempted overdose to difficulties with getting old and being retired. Denies auditory/visual hallucinations and no delusions elicited on exam.  No IM injections or aggressive behavior overnight.     REVIEW OF SYSTEMS:   • Constitutional Symptoms	No complaints  • Eyes	No complaints  • Ears / Nose / Throat / Mouth	No complaints  • Cardiovascular	No complaints  • Respiratory	No complaints  • Gastrointestinal	No complaints  • Genitourinary	No complaints  • Musculoskeletal	No complaints  • Skin	No complaints  • Neurological	No complaints  • Psychiatric (see HPI)	See HPI  • Endocrine	No complaints  • Hematologic / Lymphatic	No complaints  • Allergic / Immunologic	No complaints    REVIEW OF VITALS/LABS/IMAGING/INVESTIGATIONS:   • Vital signs reviewed: Yes  • Vital Signs:	    T(C): 36.6 (08-11-22 @ 07:32), Max: 37 (08-10-22 @ 19:05)  HR: 89 (08-11-22 @ 07:32) (76 - 98)  BP: 163/89 (08-11-22 @ 07:32) (137/74 - 163/89)  RR: 18 (08-11-22 @ 07:32) (18 - 19)  SpO2: 95% (08-11-22 @ 07:32) (95% - 97%)    • Available labs reviewed: Yes  • Available Lab Results:                       MEDICATIONS:      PRN Medications:  • PRN Medications since last evaluation none  • PRN Details	    Current Medications:   allopurinol 300 milliGRAM(s) Oral daily  atorvastatin 10 milliGRAM(s) Oral at bedtime  glucagon  Injectable 1 milliGRAM(s) IntraMuscular once  insulin lispro (ADMELOG) corrective regimen sliding scale   SubCutaneous three times a day before meals  tamsulosin 0.4 milliGRAM(s) Oral at bedtime     Medication Side Effects:  • Medication Side Effects or Adverse Reactions (new or ongoing)	None known    MENTAL STATUS EXAM:   • Level of Consciousness	Alert  • General Appearance	Well developed  • Body Habitus	Well nourished  • Hygiene	Good  • Grooming	Good  • Behavior	Cooperative  • Eye Contact	Good  • Relatedness	Good  • Impulse Control	Normal  • Muscle Tone / Strength	not assessed  • Abnormal Movements	No abnormal movements  • Gait / Station	not assessed, patient sitting down in bed  • Speech Volume	Normal  • Speech Rate	Normal  • Speech Spontaneity	Normal  • Speech Articulation	Normal  • Mood	Normal   • Affect Quality	Irritated, sad  • Affect Range	Full  • Affect Congruence	non congruent  • Thought Process	Linear  • Thought Associations	Normal   • Thought Content	Unremarkable  • Perceptions	No abnormalities  • Oriented to Time	Yes  • Oriented to Place	Yes  • Oriented to Situation	Yes  • Oriented to Person	Yes  • Attention / Concentration	Normal  • Estimated Intelligence	Average  • Recent Memory	Normal  • Remote Memory	Normal  • Fund of Knowledge	Normal  • Language	No abnormalities noted  • Judgment (regarding everyday events)	Fair  • Insight (regarding psychiatric illness)	poor    SUICIDALITY:   • Suicidality (Interval)	none known    HOMICIDALITY/AGGRESSION:   • Homicidality/Aggression	none known    DIAGNOSIS DSM-V:    Psychiatric Diagnosis (Corresponds to DSM-IV Axis I, II):   HEALTH ISSUES - PROBLEM Dx:  Major depressive disorder, recurrent, severe w/o psychotic behavior             Medical Diagnosis (Corresponds to DSM-IV Axis III):  • Axis III	None       ASSESSMENT OF CURRENT CONDITION:   Summary (include case differential, formulation and patient response to therapy): 72 year old male seen at bedside today after attempted overdose with xanax 4 days ago. Pt expressing guilt for attempt and remorse for previous behavior. He denies any suicidal/homicidal ideations, auditory/visual hallucinations. . Pt made aware of plan, he continues to state that he does not want help with current mental state.     Risk Assessment (consider static vs modifiable risk factors and protective factors; comment on level of risk for dangerous behavior): RF: recent suicide attempt, history of MDD, history of aggressive behavior  PF: remorseful for behavior     PLAN Continue to explore in patient psychiatric placement. Continue current medications. PROGRESS NOTE: 08-11-22 @ 08:28  	  • Reason for Ongoing Consultation: suicidal ideations and attempted overdose with Xanax    ID: [id number] Male with HEALTH ISSUES - PROBLEM Dx:  Major depressive disorder, recurrent, severe w/o psychotic behavior            INTERVAL DATA:   • Interval Chief Complaint: " I feel good today"  • Interval History: 72 year old male seen at bedside, sitting up eating breakfast and cooperative. PPH of MDD s/p attempted overdose with Xanax 4 days ago states he is feeling good today. Pt expressing guilt for attempting suicide and previous erratic behavior, says " I am sorry I even attempted anything I never want to go through this again".  Pt stating that he does not need help with current issues. He denies any suicidal/homicidal ideations today, attributes attempted overdose to difficulties with getting old and being retired. Denies auditory/visual hallucinations and no delusions elicited on exam.  No IM injections or aggressive behavior overnight.     REVIEW OF SYSTEMS:   • Constitutional Symptoms	No complaints  • Eyes	No complaints  • Ears / Nose / Throat / Mouth	No complaints  • Cardiovascular	No complaints  • Respiratory	No complaints  • Gastrointestinal	No complaints  • Genitourinary	No complaints  • Musculoskeletal	No complaints  • Skin	No complaints  • Neurological	No complaints  • Psychiatric (see HPI)	See HPI  • Endocrine	No complaints  • Hematologic / Lymphatic	No complaints  • Allergic / Immunologic	No complaints    REVIEW OF VITALS/LABS/IMAGING/INVESTIGATIONS:   • Vital signs reviewed: Yes  • Vital Signs:	    T(C): 36.6 (08-11-22 @ 07:32), Max: 37 (08-10-22 @ 19:05)  HR: 89 (08-11-22 @ 07:32) (76 - 98)  BP: 163/89 (08-11-22 @ 07:32) (137/74 - 163/89)  RR: 18 (08-11-22 @ 07:32) (18 - 19)  SpO2: 95% (08-11-22 @ 07:32) (95% - 97%)    • Available labs reviewed: Yes  • Available Lab Results:                       MEDICATIONS:      PRN Medications:  • PRN Medications since last evaluation none  • PRN Details	    Current Medications:   allopurinol 300 milliGRAM(s) Oral daily  atorvastatin 10 milliGRAM(s) Oral at bedtime  glucagon  Injectable 1 milliGRAM(s) IntraMuscular once  insulin lispro (ADMELOG) corrective regimen sliding scale   SubCutaneous three times a day before meals  tamsulosin 0.4 milliGRAM(s) Oral at bedtime     Medication Side Effects:  • Medication Side Effects or Adverse Reactions (new or ongoing)	None known    MENTAL STATUS EXAM:   • Level of Consciousness	Alert  • General Appearance	Well developed  • Body Habitus	Well nourished  • Hygiene	Good  • Grooming	Good  • Behavior	Cooperative  • Eye Contact	Good  • Relatedness	Good  • Impulse Control	Normal  • Muscle Tone / Strength	not assessed  • Abnormal Movements	No abnormal movements  • Gait / Station	not assessed, patient sitting down in bed  • Speech Volume	Normal  • Speech Rate	Normal  • Speech Spontaneity	Normal  • Speech Articulation	Normal  • Mood	depressed  • Affect Quality	Irritated, sad  • Affect Range	Full  • Affect Congruence	non congruent  • Thought Process	Linear  • Thought Associations	Normal   • Thought Content	Unremarkable  • Perceptions	No abnormalities  • Oriented to Time	Yes  • Oriented to Place	Yes  • Oriented to Situation	Yes  • Oriented to Person	Yes  • Attention / Concentration	Normal  • Estimated Intelligence	Average  • Recent Memory	Normal  • Remote Memory	Normal  • Fund of Knowledge	Normal  • Language	No abnormalities noted  • Judgment (regarding everyday events)	Fair  • Insight (regarding psychiatric illness)	poor    SUICIDALITY:   • Suicidality (Interval)	none known    HOMICIDALITY/AGGRESSION:   • Homicidality/Aggression	none known    DIAGNOSIS DSM-V:    Psychiatric Diagnosis (Corresponds to DSM-IV Axis I, II):   HEALTH ISSUES - PROBLEM Dx:  Major depressive disorder, recurrent, severe w/o psychotic behavior   Medical Diagnosis (Corresponds to DSM-IV Axis III):  • Axis III	None   ASSESSMENT OF CURRENT CONDITION:   Summary (include case differential, formulation and patient response to therapy): 72 year old male seen at bedside today after attempted overdose with xanax 4 days ago. Pt expressing guilt for attempt and remorse for previous behavior. He denies any suicidal/homicidal ideations, auditory/visual hallucinations. . Pt made aware of plan, he continues to state that he does not want help with current mental state.     Risk Assessment (consider static vs modifiable risk factors and protective factors; comment on level of risk for dangerous behavior): RF: recent suicide attempt, history of MDD, history of aggressive behavior  PF: remorseful for behavior     PLAN Continue to explore in patient psychiatric placement. Continue current medications.

## 2022-08-11 NOTE — ED CDU PROVIDER SUBSEQUENT DAY NOTE - MEDICAL DECISION MAKING DETAILS
Patient Instructions by Demetrius Mcgill MD at 09/11/18 03:20 PM     Author:  Demetrius Mcgill MD Service:  (none) Author Type:  Physician     Filed:  09/11/18 03:20 PM Encounter Date:  9/11/2018 Status:  Signed     :  Demetrius Mcgill MD (Physician)              PATIENT INFORMATION  Follow-Up  - Return for your yearly well child visit.    9-10 years old Health and Safety Tips - The following hyperlinks are available to access via MyChart    Bright Futures 9-10 Years Old Parent Education  Broadersheet 9-10 Years Old Child Education    Futuros Brillantes 9 a 10 años de edad (Educación para los padres) - Español  Futuros Brillantes 9 a 10 años de edad (Educación para los niños) - Español    Additional Educational Resources:  For additional resources regarding your symptoms, diagnosis, or further health information, please visit the Discover a Healthier You section on [email or web address].[facility]com/ or the Online Health Resources section in [employer].      Revision History        User Key Date/Time User Provider Type Action    > [N/A] 09/11/18 03:20 PM Demetrius Mcgill MD Physician Sign            
Requires involuntary psychiatric admission for suicidal gesture. Being held for bed placement.
Requires involuntary psychiatric admission for suicidal gesture. Being held for bed placement.

## 2022-08-11 NOTE — ED CDU PROVIDER SUBSEQUENT DAY NOTE - ENMT NEGATIVE STATEMENT, MLM
Ears: no ear pain and no hearing problems. Nose: no nasal congestion and no nasal drainage. Mouth/Throat: no dysphagia, no hoarseness and no throat pain. Neck: no lumps, no pain, no stiffness and no swollen glands
Ears: no ear pain and no hearing problems. Nose: no nasal congestion and no nasal drainage. Mouth/Throat: no dysphagia, no hoarseness and no throat pain. Neck: no lumps, no pain, no stiffness and no swollen glands

## 2022-08-11 NOTE — ED ADULT NURSE REASSESSMENT NOTE - NS ED NURSE REASSESS COMMENT FT1
Assumed care of patient.  Pt alert and cooperative. Pt lying in darken room watching tv.  Pt out of bed to bathroom with slow steady gait.  Pt requesting water and provided.  Will monitor and chart changes
At 0430 patient out of bed ambulating with steady slow gait. Pt confused at this time.   Attempt to redirect patient back to room and patient states that he  wants to leave.  Pt unable to be redirected at this time.  Pt attempting to enter environmental closet in attempt to leave. Security called for assistance. Pt becoming loud and threatening.  Pt attempting to get out of the unit.  Dr. Cat made aware and patient then medicated with Haldol5 mg and Ativan 2 mg IM with security present.  Pt left in bed in darken room will monitor and chart changes
Pt resting comfortably in bed, No acute distress noted at this time safety maintained.  Pt encouraged to report any needs or changes to staff.
pt awake and alert with NAD. pt is compliant with hs medications and finger stick. pt has made no attempts to harm himself or others. pt safety maintained.
pt resting in his room and offers no complaints. pt was compliant with medications and finger stick, bs 224 with coverage of Admelog 4units im. pt tolerated well. pt has made no attempts to harm himself or others.
Patient has been sleeping this morning. Unable/unwilling to speak to staff this morning, did not open his eyes or respond verbally, rolled over and went to ignored staff. Did not wake up for breakfast or accucheck. No distress noted. Plan to transfer to inpatient unit when bed available.
Patient rec'd in bed, sleeping at this time. Awaiting transfer to inpatient facility when bed becomes available. No distress noted.
Assumed care of pt at 19:15 as stated in report from previous RN. Charting as noted. Patient A&O x4, denies pain/discomfort, denies CP/SOB. Updated on the plan of care. Call bell within reach, bed locked in lowest position. IV site flushed w/ NS. No redness, swelling or pain noted to site. No signs of acute distress noted, safety maintained. Patient awaiting inpatient psychiatric placement, patient remains in yellow gown w/ no belongings at bedside, 1:1 remains at bedside.

## 2022-08-11 NOTE — ED CDU PROVIDER DISPOSITION NOTE - CLINICAL COURSE
72 year old male seen at bedside today after attempted overdose with xanax 4 days ago. Pt expressing guilt for attempt and remorse for previous behavior. He denies any suicidal/homicidal ideations, auditory/visual hallucinations. . Pt made aware of plan, he continues to state that he does not want help with current mental state. seen by psych who recommends inpatient invol admission.

## 2022-08-11 NOTE — CHART NOTE - NSCHARTNOTEFT_GEN_A_CORE
SW Note: Pt will be transferred to [facility] for in psychiatric care, [phone]. Accepting Md Dr. Shin. Pt aware and gave verbal consent to notify his wife Ros. called and made Ros aware of transfer and provided contact info for  accepting facility. She is aware they have no visiting at this time. [facility]. [facility] letter provided and spouse aware of ins.   Called [employer] medicare plan listed on face and completed auth for admission. Spoke with ins DIMA OWEN [phone]. Auth approved for 6 days 8/11-8/17. Auth# [id number].Rev due 8/17 with Pinky BROOKE at [phone] admission was provided ins info.   Transfer packet made and left with RAKESH CARBALLO

## 2022-08-11 NOTE — PROVIDER CONTACT NOTE (OTHER) - SITUATION
chart reviewed. order received. eval completed and charted. case discussed with nurse, edith, and ccc wilman.
Chart reviewed and contents noted.  No c/o pain, before, during or after Rx.

## 2023-01-18 NOTE — ED PROVIDER NOTE - TEMPLATE, MLM
[FreeTextEntry1] : This note was written by Dulce Marques on 01/17/2023 acting solely as a scribe for Dr. Hoang Mai.\par \par All medical record entries made by the Scribe were at my, Dr. Hoang Mai, direction and personally dictated by me on 01/17/2023. I have personally reviewed the chart and agree that the record accurately reflects my personal performance of the history, physical exam, assessment and plan. 
General

## 2024-11-15 ENCOUNTER — APPOINTMENT (OUTPATIENT)
Dept: NEUROLOGY | Facility: CLINIC | Age: 74
End: 2024-11-15

## 2024-12-13 NOTE — ED PROVIDER NOTE - GASTROINTESTINAL NEGATIVE STATEMENT, MLM
no abdominal pain, no bloating, no constipation, no diarrhea, no nausea and no vomiting. normal patient pattern